# Patient Record
Sex: FEMALE | Race: WHITE | NOT HISPANIC OR LATINO | Employment: FULL TIME | URBAN - METROPOLITAN AREA
[De-identification: names, ages, dates, MRNs, and addresses within clinical notes are randomized per-mention and may not be internally consistent; named-entity substitution may affect disease eponyms.]

---

## 2023-09-08 DIAGNOSIS — Z00.00 ROUTINE HEALTH MAINTENANCE: ICD-10-CM

## 2023-09-08 RX ORDER — DESOGESTREL AND ETHINYL ESTRADIOL AND ETHINYL ESTRADIOL 21-5 (28)
1 KIT ORAL DAILY
COMMUNITY
End: 2023-09-08 | Stop reason: SDUPTHER

## 2023-09-08 RX ORDER — DESOGESTREL AND ETHINYL ESTRADIOL AND ETHINYL ESTRADIOL 21-5 (28)
1 KIT ORAL DAILY
Qty: 84 TABLET | Refills: 0 | Status: SHIPPED | OUTPATIENT
Start: 2023-09-08 | End: 2023-11-13 | Stop reason: SDUPTHER

## 2023-10-08 PROBLEM — M51.36 LUMBAR DEGENERATIVE DISC DISEASE: Status: ACTIVE | Noted: 2023-10-08

## 2023-10-08 PROBLEM — Z00.00 ROUTINE ADULT HEALTH MAINTENANCE: Status: ACTIVE | Noted: 2023-10-08

## 2023-10-08 PROBLEM — M51.369 LUMBAR DEGENERATIVE DISC DISEASE: Status: ACTIVE | Noted: 2023-10-08

## 2023-10-08 PROBLEM — E55.9 VITAMIN D DEFICIENCY: Status: ACTIVE | Noted: 2023-10-08

## 2023-10-08 PROBLEM — L20.9 ATOPIC DERMATITIS: Status: ACTIVE | Noted: 2023-10-08

## 2023-10-08 PROBLEM — M47.816 LUMBAR SPONDYLOSIS: Status: ACTIVE | Noted: 2023-10-08

## 2023-10-08 PROBLEM — Z92.89 H/O TRANSFUSION: Status: ACTIVE | Noted: 2023-10-08

## 2023-10-08 PROBLEM — Z87.42 H/O ABNORMAL CERVICAL PAPANICOLAOU SMEAR: Status: ACTIVE | Noted: 2023-10-08

## 2023-11-06 ENCOUNTER — LAB (OUTPATIENT)
Dept: LAB | Facility: LAB | Age: 53
End: 2023-11-06
Payer: COMMERCIAL

## 2023-11-06 DIAGNOSIS — E55.9 VITAMIN D DEFICIENCY, UNSPECIFIED: ICD-10-CM

## 2023-11-06 DIAGNOSIS — Z00.00 ENCOUNTER FOR GENERAL ADULT MEDICAL EXAMINATION WITHOUT ABNORMAL FINDINGS: ICD-10-CM

## 2023-11-06 LAB
25(OH)D3 SERPL-MCNC: 34 NG/ML (ref 30–100)
ALBUMIN SERPL BCP-MCNC: 4.3 G/DL (ref 3.4–5)
ALP SERPL-CCNC: 55 U/L (ref 33–110)
ALT SERPL W P-5'-P-CCNC: 14 U/L (ref 7–45)
ANION GAP SERPL CALC-SCNC: 11 MMOL/L (ref 10–20)
APPEARANCE UR: ABNORMAL
AST SERPL W P-5'-P-CCNC: 14 U/L (ref 9–39)
BASOPHILS # BLD AUTO: 0.09 X10*3/UL (ref 0–0.1)
BASOPHILS NFR BLD AUTO: 0.9 %
BILIRUB SERPL-MCNC: 0.8 MG/DL (ref 0–1.2)
BILIRUB UR STRIP.AUTO-MCNC: NEGATIVE MG/DL
BUN SERPL-MCNC: 9 MG/DL (ref 6–23)
CALCIUM SERPL-MCNC: 9.3 MG/DL (ref 8.6–10.3)
CHLORIDE SERPL-SCNC: 102 MMOL/L (ref 98–107)
CHOLEST SERPL-MCNC: 164 MG/DL (ref 0–199)
CHOLESTEROL/HDL RATIO: 2.4
CO2 SERPL-SCNC: 29 MMOL/L (ref 21–32)
COLOR UR: YELLOW
CREAT SERPL-MCNC: 0.7 MG/DL (ref 0.5–1.05)
EOSINOPHIL # BLD AUTO: 0.26 X10*3/UL (ref 0–0.7)
EOSINOPHIL NFR BLD AUTO: 2.5 %
ERYTHROCYTE [DISTWIDTH] IN BLOOD BY AUTOMATED COUNT: 11.6 % (ref 11.5–14.5)
GFR SERPL CREATININE-BSD FRML MDRD: >90 ML/MIN/1.73M*2
GLUCOSE SERPL-MCNC: 91 MG/DL (ref 74–99)
GLUCOSE UR STRIP.AUTO-MCNC: NEGATIVE MG/DL
HCT VFR BLD AUTO: 44.8 % (ref 36–46)
HDLC SERPL-MCNC: 68.7 MG/DL
HGB BLD-MCNC: 14.7 G/DL (ref 12–16)
IMM GRANULOCYTES # BLD AUTO: 0.05 X10*3/UL (ref 0–0.7)
IMM GRANULOCYTES NFR BLD AUTO: 0.5 % (ref 0–0.9)
KETONES UR STRIP.AUTO-MCNC: NEGATIVE MG/DL
LDLC SERPL CALC-MCNC: 81 MG/DL
LEUKOCYTE ESTERASE UR QL STRIP.AUTO: ABNORMAL
LYMPHOCYTES # BLD AUTO: 2.25 X10*3/UL (ref 1.2–4.8)
LYMPHOCYTES NFR BLD AUTO: 21.6 %
MCH RBC QN AUTO: 31.3 PG (ref 26–34)
MCHC RBC AUTO-ENTMCNC: 32.8 G/DL (ref 32–36)
MCV RBC AUTO: 95 FL (ref 80–100)
MONOCYTES # BLD AUTO: 0.8 X10*3/UL (ref 0.1–1)
MONOCYTES NFR BLD AUTO: 7.7 %
MUCOUS THREADS #/AREA URNS AUTO: ABNORMAL /LPF
NEUTROPHILS # BLD AUTO: 6.97 X10*3/UL (ref 1.2–7.7)
NEUTROPHILS NFR BLD AUTO: 66.8 %
NITRITE UR QL STRIP.AUTO: NEGATIVE
NON HDL CHOLESTEROL: 95 MG/DL (ref 0–149)
NRBC BLD-RTO: 0 /100 WBCS (ref 0–0)
PH UR STRIP.AUTO: 5 [PH]
PLATELET # BLD AUTO: 300 X10*3/UL (ref 150–450)
POTASSIUM SERPL-SCNC: 4.1 MMOL/L (ref 3.5–5.3)
PROT SERPL-MCNC: 6.9 G/DL (ref 6.4–8.2)
PROT UR STRIP.AUTO-MCNC: NEGATIVE MG/DL
RBC # BLD AUTO: 4.7 X10*6/UL (ref 4–5.2)
RBC # UR STRIP.AUTO: NEGATIVE /UL
RBC #/AREA URNS AUTO: ABNORMAL /HPF
SODIUM SERPL-SCNC: 138 MMOL/L (ref 136–145)
SP GR UR STRIP.AUTO: 1.02
SQUAMOUS #/AREA URNS AUTO: ABNORMAL /HPF
TRIGL SERPL-MCNC: 71 MG/DL (ref 0–149)
TSH SERPL-ACNC: 1.37 MIU/L (ref 0.44–3.98)
UROBILINOGEN UR STRIP.AUTO-MCNC: <2 MG/DL
VLDL: 14 MG/DL (ref 0–40)
WBC # BLD AUTO: 10.4 X10*3/UL (ref 4.4–11.3)
WBC #/AREA URNS AUTO: ABNORMAL /HPF

## 2023-11-06 PROCEDURE — 81001 URINALYSIS AUTO W/SCOPE: CPT

## 2023-11-06 PROCEDURE — 80053 COMPREHEN METABOLIC PANEL: CPT

## 2023-11-06 PROCEDURE — 84443 ASSAY THYROID STIM HORMONE: CPT

## 2023-11-06 PROCEDURE — 82306 VITAMIN D 25 HYDROXY: CPT

## 2023-11-06 PROCEDURE — 80061 LIPID PANEL: CPT

## 2023-11-06 PROCEDURE — 36415 COLL VENOUS BLD VENIPUNCTURE: CPT

## 2023-11-06 PROCEDURE — 85025 COMPLETE CBC W/AUTO DIFF WBC: CPT

## 2023-11-13 ENCOUNTER — OFFICE VISIT (OUTPATIENT)
Dept: PRIMARY CARE | Facility: CLINIC | Age: 53
End: 2023-11-13
Payer: COMMERCIAL

## 2023-11-13 VITALS
BODY MASS INDEX: 28.28 KG/M2 | SYSTOLIC BLOOD PRESSURE: 128 MMHG | HEART RATE: 76 BPM | DIASTOLIC BLOOD PRESSURE: 80 MMHG | HEIGHT: 66 IN | WEIGHT: 176 LBS

## 2023-11-13 DIAGNOSIS — E55.9 VITAMIN D DEFICIENCY: ICD-10-CM

## 2023-11-13 DIAGNOSIS — Z12.4 SCREENING FOR CERVICAL CANCER: ICD-10-CM

## 2023-11-13 DIAGNOSIS — Z82.49 FAMILY HISTORY OF HEART DISEASE: ICD-10-CM

## 2023-11-13 DIAGNOSIS — Z00.00 ROUTINE ADULT HEALTH MAINTENANCE: ICD-10-CM

## 2023-11-13 DIAGNOSIS — Z13.6 SCREENING FOR CARDIOVASCULAR CONDITION: ICD-10-CM

## 2023-11-13 DIAGNOSIS — Z00.00 ROUTINE HEALTH MAINTENANCE: Primary | ICD-10-CM

## 2023-11-13 DIAGNOSIS — Z12.31 ENCOUNTER FOR SCREENING MAMMOGRAM FOR BREAST CANCER: ICD-10-CM

## 2023-11-13 DIAGNOSIS — N95.1 PERIMENOPAUSE: ICD-10-CM

## 2023-11-13 DIAGNOSIS — Z23 NEED FOR IMMUNIZATION AGAINST INFLUENZA: ICD-10-CM

## 2023-11-13 PROCEDURE — 90471 IMMUNIZATION ADMIN: CPT | Performed by: INTERNAL MEDICINE

## 2023-11-13 PROCEDURE — 88175 CYTOPATH C/V AUTO FLUID REDO: CPT

## 2023-11-13 PROCEDURE — 90686 IIV4 VACC NO PRSV 0.5 ML IM: CPT | Performed by: INTERNAL MEDICINE

## 2023-11-13 PROCEDURE — 1036F TOBACCO NON-USER: CPT | Performed by: INTERNAL MEDICINE

## 2023-11-13 PROCEDURE — 3008F BODY MASS INDEX DOCD: CPT | Performed by: INTERNAL MEDICINE

## 2023-11-13 PROCEDURE — 99396 PREV VISIT EST AGE 40-64: CPT | Performed by: INTERNAL MEDICINE

## 2023-11-13 RX ORDER — DESOGESTREL AND ETHINYL ESTRADIOL AND ETHINYL ESTRADIOL 21-5 (28)
1 KIT ORAL DAILY
Qty: 84 TABLET | Refills: 3 | Status: SHIPPED | OUTPATIENT
Start: 2023-11-13

## 2023-11-13 RX ORDER — MULTIVITAMIN
1 TABLET ORAL
COMMUNITY
Start: 2018-07-13

## 2023-11-13 RX ORDER — VIT C/E/ZN/COPPR/LUTEIN/ZEAXAN 250MG-90MG
25 CAPSULE ORAL DAILY
Qty: 30 CAPSULE | Refills: 11
Start: 2023-11-13 | End: 2024-11-12

## 2023-11-13 NOTE — PATIENT INSTRUCTIONS
Do labs off birth control for 1-2 months if able  If unable do the labs during the week of your period

## 2023-11-13 NOTE — PROGRESS NOTES
ANNUAL CPE VISIT  Chief Complaint   Patient presents with    Annual Exam     Flulaval ordered   New stressors at home.    Nothing we can help with  Daughter with special needs    HPI: Labs reviewed:  Component      Latest Ref Rng 11/6/2023   WBC      4.4 - 11.3 x10*3/uL 10.4    nRBC      0.0 - 0.0 /100 WBCs 0.0    RBC      4.00 - 5.20 x10*6/uL 4.70    HEMOGLOBIN      12.0 - 16.0 g/dL 14.7    HEMATOCRIT      36.0 - 46.0 % 44.8    MCV      80 - 100 fL 95    MCH      26.0 - 34.0 pg 31.3    MCHC      32.0 - 36.0 g/dL 32.8    RED CELL DISTRIBUTION WIDTH      11.5 - 14.5 % 11.6    Platelets      150 - 450 x10*3/uL 300    Neutrophils %      40.0 - 80.0 % 66.8    Immature Granulocytes %, Automated      0.0 - 0.9 % 0.5    Lymphocytes %      13.0 - 44.0 % 21.6    Monocytes %      2.0 - 10.0 % 7.7    Eosinophils %      0.0 - 6.0 % 2.5    Basophils %      0.0 - 2.0 % 0.9    Neutrophils Absolute      1.20 - 7.70 x10*3/uL 6.97    Immature Granulocytes Absolute, Automated      0.00 - 0.70 x10*3/uL 0.05    Lymphocytes Absolute      1.20 - 4.80 x10*3/uL 2.25    Monocytes Absolute      0.10 - 1.00 x10*3/uL 0.80    Eosinophils Absolute      0.00 - 0.70 x10*3/uL 0.26    Basophils Absolute      0.00 - 0.10 x10*3/uL 0.09    GLUCOSE      74 - 99 mg/dL 91    SODIUM      136 - 145 mmol/L 138    POTASSIUM      3.5 - 5.3 mmol/L 4.1    CHLORIDE      98 - 107 mmol/L 102    Bicarbonate      21 - 32 mmol/L 29    Anion Gap      10 - 20 mmol/L 11    Blood Urea Nitrogen      6 - 23 mg/dL 9    Creatinine      0.50 - 1.05 mg/dL 0.70    EGFR      >60 mL/min/1.73m*2 >90    Calcium      8.6 - 10.3 mg/dL 9.3    Albumin      3.4 - 5.0 g/dL 4.3    Alkaline Phosphatase      33 - 110 U/L 55    Total Protein      6.4 - 8.2 g/dL 6.9    AST      9 - 39 U/L 14    Bilirubin Total      0.0 - 1.2 mg/dL 0.8    ALT      7 - 45 U/L 14    Color, Urine      Straw, Yellow  Yellow    Appearance, Urine      Clear  Hazy ! (N)    Specific Gravity, Urine      1.005 - 1.035   1.019    pH, Urine      5.0, 5.5, 6.0, 6.5, 7.0, 7.5, 8.0  5.0    Protein, Urine      NEGATIVE mg/dL NEGATIVE    Glucose, Urine      NEGATIVE mg/dL NEGATIVE    Blood, Urine      NEGATIVE  NEGATIVE    Ketones, Urine      NEGATIVE mg/dL NEGATIVE    Bilirubin, Urine      NEGATIVE  NEGATIVE    Urobilinogen, Urine      <2.0 mg/dL <2.0    Nitrite, Urine      NEGATIVE  NEGATIVE    Leukocyte Esterase, Urine      NEGATIVE  TRACE !    CHOLESTEROL      0 - 199 mg/dL 164    HDL CHOLESTEROL      mg/dL 68.7    Cholesterol/HDL Ratio 2.4    LDL Calculated      <=99 mg/dL 81    VLDL      0 - 40 mg/dL 14    TRIGLYCERIDES      0 - 149 mg/dL 71    Non HDL Cholesterol      0 - 149 mg/dL 95    WBC, Urine      1-5, NONE /HPF 6-10 !    RBC, Urine      NONE, 1-2, 3-5 /HPF NONE    Squamous Epithelial Cells, Urine      Reference range not established. /HPF 1-9 (SPARSE)    Mucus, Urine      Reference range not established. /LPF 1+    Thyroid Stimulating Hormone      0.44 - 3.98 mIU/L 1.37    Vitamin D, 25-Hydroxy, Total      30 - 100 ng/mL 34           Assessment and Plan:  Problem List Items Addressed This Visit          High    Routine adult health maintenance    Overview         PFIZER COVID 19 VACCINE 4/22/21, 5/13/21      Influenza Seasonal Inj Quadrivalent Age 3+1/19/2017, 10/25/22                     Tdap (Age 7+)2/20/2006         Tetanus Diphtheria Booster (Age >7) Pres Free1/19/2017      PAP/HPV (-) 9/14/18; 10/11/21 (-)      Mamm 10/19/18, 9/30/19; 10/13/20, 11/4/21, 11/15/22      Cscope 2/25/22 (11yrs)         Current Assessment & Plan     Annual Wellness exam completed   Preventive Health History reviewed  Ordered:   Labs    Mammogram   PAP done per request  She is still on OCP, will check FSH/LH off OCP next year when does labs  Discussed Shingrix         Relevant Orders    Comprehensive Metabolic Panel    CBC and Auto Differential    Lipid Panel    Urinalysis with Reflex Microscopic and Culture       Medium    BMI 28.0-28.9,adult  "   Overview      Failed IF      Working on diet/portions/not snacking/mindless eating         Relevant Orders    TSH with reflex to Free T4 if abnormal    Encounter for screening mammogram for breast cancer    Relevant Orders    BI mammo bilateral screening tomosynthesis    Routine health maintenance - Primary    Relevant Medications    Viorele, 28, 0.15-0.02 mgx21 /0.01 mg x 5 tablet    Screening for cervical cancer    Relevant Orders    THINPREP PAP TEST    Vitamin D deficiency    Overview     11/23: 34  Goal ~50         Current Assessment & Plan     Add 1K daily         Relevant Medications    cholecalciferol (Vitamin D-3) 25 MCG (1000 UT) capsule    Other Relevant Orders    Vitamin D 25-Hydroxy,Total (for eval of Vitamin D levels)     Other Visit Diagnoses       Need for immunization against influenza        Relevant Orders    Flu vaccine (IIV4) age 6 months and greater, preservative free (Completed)    Screening for cardiovascular condition        Relevant Orders    CT cardiac scoring wo IV contrast    Family history of heart disease        She will think about getting the free CAC test    Perimenopause        Relevant Orders    TSH with reflex to Free T4 if abnormal    FSH & LH            ROS otherwise negative aside from what was mentioned above in HPI.    Vitals  /80   Pulse 76   Ht 1.676 m (5' 6\")   Wt 79.8 kg (176 lb)   BMI 28.41 kg/m²   Body mass index is 28.41 kg/m².  Physical Exam  Gen: Alert, NAD  HEENT:  Normal exam  Neck:  No masses/nodes palpable; Thyroid nontender and without nodules; No HUY  Respiratory:  Lungs CTAB  CV: RRR  Neuro:  Gross motor and sensory intact  Skin:  No suspicious lesions present  Breast: No masses or axillary lymphadenopathy  Gyn: Normal pelvic exam: no uterine masses or cervical lesions, or CMT; no vaginal D/C. No ovarian or adnexal masses; No external vaginal or anal/perineal lesions (Pt declined chaperone)'    Active Problem List  Patient Active Problem List "   Diagnosis    Routine adult health maintenance    Atopic dermatitis    Lumbar degenerative disc disease    Lumbar spondylosis    Vitamin D deficiency    BMI 28.0-28.9,adult    H/O abnormal cervical Papanicolaou smear    H/O transfusion    Screening for cervical cancer    Encounter for screening mammogram for breast cancer    Routine health maintenance       Comprehensive Medical/Surgical/Social/Family History  Past Medical History:   Diagnosis Date    H/O echocardiogram     2007: Normal. trivial AI, trivial MR, trivial TR, and trivial PI. LVEF ~60%    H/O magnetic resonance imaging of brain and brain stem     2015: moderate pansinus disease as described above with mucosal thickening and     enhancement     Past Surgical History:   Procedure Laterality Date    COLONOSCOPY      2/25/22: normal    OTHER SURGICAL HISTORY  07/30/2019    Appendectomy     Social History     Social History Narrative    Family History        M: Thyroid nodule        F: Heart murmur, psychiatric? 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

## 2023-11-13 NOTE — ASSESSMENT & PLAN NOTE
Annual Wellness exam completed   Preventive Health History reviewed  Ordered:   Labs    Mammogram   PAP done per request  She is still on OCP, will check FSH/LH off OCP next year when does labs  Discussed Shingrix

## 2023-11-27 LAB
CYTOLOGY CMNT CVX/VAG CYTO-IMP: NORMAL
LAB AP HPV GENOTYPE QUESTION: YES
LAB AP HPV HR: NORMAL
LABORATORY COMMENT REPORT: NORMAL
PATH REPORT.TOTAL CANCER: NORMAL

## 2023-12-12 ENCOUNTER — ANCILLARY PROCEDURE (OUTPATIENT)
Dept: RADIOLOGY | Facility: CLINIC | Age: 53
End: 2023-12-12
Payer: COMMERCIAL

## 2023-12-12 DIAGNOSIS — Z12.31 ENCOUNTER FOR SCREENING MAMMOGRAM FOR BREAST CANCER: ICD-10-CM

## 2023-12-12 PROCEDURE — 77063 BREAST TOMOSYNTHESIS BI: CPT | Performed by: RADIOLOGY

## 2023-12-12 PROCEDURE — 77067 SCR MAMMO BI INCL CAD: CPT | Performed by: RADIOLOGY

## 2023-12-12 PROCEDURE — 77067 SCR MAMMO BI INCL CAD: CPT

## 2024-07-17 ENCOUNTER — APPOINTMENT (OUTPATIENT)
Dept: PRIMARY CARE | Facility: CLINIC | Age: 54
End: 2024-07-17
Payer: COMMERCIAL

## 2024-07-17 VITALS
DIASTOLIC BLOOD PRESSURE: 80 MMHG | TEMPERATURE: 98.2 F | HEART RATE: 65 BPM | OXYGEN SATURATION: 96 % | WEIGHT: 180.6 LBS | SYSTOLIC BLOOD PRESSURE: 113 MMHG | BODY MASS INDEX: 29.15 KG/M2

## 2024-07-17 DIAGNOSIS — R09.A2 FOREIGN BODY SENSATION, THROAT: Primary | ICD-10-CM

## 2024-07-17 PROCEDURE — 1036F TOBACCO NON-USER: CPT | Performed by: NURSE PRACTITIONER

## 2024-07-17 PROCEDURE — 99213 OFFICE O/P EST LOW 20 MIN: CPT | Performed by: NURSE PRACTITIONER

## 2024-07-17 RX ORDER — FAMOTIDINE 20 MG/1
20 TABLET, FILM COATED ORAL 2 TIMES DAILY
Qty: 60 TABLET | Refills: 5 | Status: SHIPPED | OUTPATIENT
Start: 2024-07-17 | End: 2025-01-13

## 2024-07-17 ASSESSMENT — PATIENT HEALTH QUESTIONNAIRE - PHQ9
1. LITTLE INTEREST OR PLEASURE IN DOING THINGS: NOT AT ALL
SUM OF ALL RESPONSES TO PHQ9 QUESTIONS 1 AND 2: 0
2. FEELING DOWN, DEPRESSED OR HOPELESS: NOT AT ALL

## 2024-07-17 NOTE — PROGRESS NOTES
Problem List Items Addressed This Visit    None  Visit Diagnoses       Foreign body sensation, throat    -  Primary    discomfort with thyroid palpation on exam  given maternal hx nodules, will get US  ? uncontrolled GERD v PND  - try pepcid 20 mg bid  if persists ENT fu    Relevant Medications    famotidine (Pepcid) 20 mg tablet    Other Relevant Orders    US thyroid             Subjective   Patient ID: Idalia Arshad is a 53 y.o. female who presents for feels like something in her throat (feels like something in her throat occasionally  she can still swallow/Has been going on for a few months does not happen with eating/No hx of gerd or acid reflux//Stopped taking birth control in December has not had a period since then).  HPI  Sensation in throat  Comes & comes  Feels like phlegm stuck  No choking  No dyspnea  No hemoptysis  No epigastric discomfort  Exacerbated by:   Relieved by:   Notices more in the afternoon   No belching  No treatments   No lymphs    Component      Latest Ref Rn 11/6/2023   LEUKOCYTES (10*3/UL) IN BLOOD BY AUTOMATED COUNT, Arabic      4.4 - 11.3 x10*3/uL 10.4    nRBC      0.0 - 0.0 /100 WBCs 0.0    ERYTHROCYTES (10*6/UL) IN BLOOD BY AUTOMATED COUNT, Arabic      4.00 - 5.20 x10*6/uL 4.70    HEMOGLOBIN      12.0 - 16.0 g/dL 14.7    HEMATOCRIT      36.0 - 46.0 % 44.8    MCV      80 - 100 fL 95    MCH      26.0 - 34.0 pg 31.3    MCHC      32.0 - 36.0 g/dL 32.8    RED CELL DISTRIBUTION WIDTH      11.5 - 14.5 % 11.6    PLATELETS (10*3/UL) IN BLOOD AUTOMATED COUNT, Arabic      150 - 450 x10*3/uL 300    NEUTROPHILS/100 LEUKOCYTES IN BLOOD BY AUTOMATED COUNT, Arabic      40.0 - 80.0 % 66.8    Immature Granulocytes %, Automated      0.0 - 0.9 % 0.5    Lymphocytes %      13.0 - 44.0 % 21.6    Monocytes %      2.0 - 10.0 % 7.7    Eosinophils %      0.0 - 6.0 % 2.5    Basophils %      0.0 - 2.0 % 0.9    NEUTROPHILS (10*3/UL) IN BLOOD BY AUTOMATED COUNT, Arabic      1.20 - 7.70 x10*3/uL 6.97     Immature Granulocytes Absolute, Automated      0.00 - 0.70 x10*3/uL 0.05    Lymphocytes Absolute      1.20 - 4.80 x10*3/uL 2.25    Monocytes Absolute      0.10 - 1.00 x10*3/uL 0.80    Eosinophils Absolute      0.00 - 0.70 x10*3/uL 0.26    Basophils Absolute      0.00 - 0.10 x10*3/uL 0.09    GLUCOSE      74 - 99 mg/dL 91    SODIUM      136 - 145 mmol/L 138    POTASSIUM      3.5 - 5.3 mmol/L 4.1    CHLORIDE      98 - 107 mmol/L 102    Bicarbonate      21 - 32 mmol/L 29    Anion Gap      10 - 20 mmol/L 11    Blood Urea Nitrogen      6 - 23 mg/dL 9    Creatinine      0.50 - 1.05 mg/dL 0.70    EGFR      >60 mL/min/1.73m*2 >90    Calcium      8.6 - 10.3 mg/dL 9.3    Albumin      3.4 - 5.0 g/dL 4.3    Alkaline Phosphatase      33 - 110 U/L 55    Total Protein      6.4 - 8.2 g/dL 6.9    AST      9 - 39 U/L 14    Bilirubin Total      0.0 - 1.2 mg/dL 0.8    ALT      7 - 45 U/L 14    Color, Urine      Straw, Yellow  Yellow    Appearance, Urine      Clear  Hazy ! (N)    Specific Gravity, Urine      1.005 - 1.035  1.019    pH, Urine      5.0, 5.5, 6.0, 6.5, 7.0, 7.5, 8.0  5.0    Protein, Urine      NEGATIVE mg/dL NEGATIVE    Glucose, Urine      NEGATIVE mg/dL NEGATIVE    Blood, Urine      NEGATIVE  NEGATIVE    Ketones, Urine      NEGATIVE mg/dL NEGATIVE    Bilirubin, Urine      NEGATIVE  NEGATIVE    Urobilinogen, Urine      <2.0 mg/dL <2.0    Nitrite, Urine      NEGATIVE  NEGATIVE    Leukocyte Esterase, Urine      NEGATIVE  TRACE !    CHOLESTEROL      0 - 199 mg/dL 164    HDL CHOLESTEROL      mg/dL 68.7    Cholesterol/HDL Ratio 2.4    LDL Calculated      <=99 mg/dL 81    VLDL      0 - 40 mg/dL 14    TRIGLYCERIDES      0 - 149 mg/dL 71    Non HDL Cholesterol      0 - 149 mg/dL 95    WBC, Urine      1-5, NONE /HPF 6-10 !    RBC, Urine      NONE, 1-2, 3-5 /HPF NONE    Squamous Epithelial Cells, Urine      Reference range not established. /HPF 1-9 (SPARSE)    Mucus, Urine      Reference range not established. /LPF 1+    Thyroid  "Stimulating Hormone      0.44 - 3.98 mIU/L 1.37    Vitamin D, 25-Hydroxy, Total      30 - 100 ng/mL 34         Review of Systems   All other systems reviewed and are negative.      BP Readings from Last 3 Encounters:   07/17/24 113/80   11/13/23 128/80   10/25/22 125/83      Wt Readings from Last 3 Encounters:   07/17/24 81.9 kg (180 lb 9.6 oz)   11/13/23 79.8 kg (176 lb)   10/25/22 83.5 kg (184 lb)      BMI:   Estimated body mass index is 29.15 kg/m² as calculated from the following:    Height as of 11/13/23: 1.676 m (5' 6\").    Weight as of this encounter: 81.9 kg (180 lb 9.6 oz).    Objective   Physical Exam  Constitutional:       General: She is not in acute distress.  HENT:      Head: Normocephalic and atraumatic.      Right Ear: Tympanic membrane and external ear normal.      Left Ear: Tympanic membrane and external ear normal.      Nose: Nose normal.      Mouth/Throat:      Mouth: Mucous membranes are moist.   Eyes:      Extraocular Movements: Extraocular movements intact.      Conjunctiva/sclera: Conjunctivae normal.   Neck:      Comments: Discomfort when palpating over bilat thyroid. No lymphs or nodules palpated   Cardiovascular:      Rate and Rhythm: Normal rate and regular rhythm.      Pulses: Normal pulses.   Pulmonary:      Effort: Pulmonary effort is normal.      Breath sounds: Normal breath sounds.   Abdominal:      General: Bowel sounds are normal.      Palpations: Abdomen is soft.   Musculoskeletal:         General: Normal range of motion.      Cervical back: Normal range of motion and neck supple.   Skin:     General: Skin is warm and dry.   Neurological:      General: No focal deficit present.      Mental Status: She is alert.   Psychiatric:         Mood and Affect: Mood normal.       "

## 2024-07-18 ENCOUNTER — HOSPITAL ENCOUNTER (OUTPATIENT)
Dept: RADIOLOGY | Facility: CLINIC | Age: 54
Discharge: HOME | End: 2024-07-18
Payer: COMMERCIAL

## 2024-07-18 DIAGNOSIS — R09.A2 FOREIGN BODY SENSATION, THROAT: ICD-10-CM

## 2024-07-18 PROCEDURE — 76536 US EXAM OF HEAD AND NECK: CPT | Performed by: STUDENT IN AN ORGANIZED HEALTH CARE EDUCATION/TRAINING PROGRAM

## 2024-07-18 PROCEDURE — 76536 US EXAM OF HEAD AND NECK: CPT

## 2024-07-20 DIAGNOSIS — R09.A2 FOREIGN BODY SENSATION, THROAT: ICD-10-CM

## 2024-07-20 DIAGNOSIS — E04.1 THYROID NODULE: Primary | ICD-10-CM

## 2024-07-22 ENCOUNTER — PATIENT MESSAGE (OUTPATIENT)
Dept: PRIMARY CARE | Facility: CLINIC | Age: 54
End: 2024-07-22

## 2024-07-22 ENCOUNTER — LAB (OUTPATIENT)
Dept: LAB | Facility: LAB | Age: 54
End: 2024-07-22
Payer: COMMERCIAL

## 2024-07-22 DIAGNOSIS — E04.1 THYROID NODULE: ICD-10-CM

## 2024-07-22 LAB
T3FREE SERPL-MCNC: 3.3 PG/ML (ref 2.3–4.2)
T4 FREE SERPL-MCNC: 0.97 NG/DL (ref 0.61–1.12)
THYROPEROXIDASE AB SERPL-ACNC: 42 IU/ML
TSH SERPL-ACNC: 1.22 MIU/L (ref 0.44–3.98)

## 2024-07-22 PROCEDURE — 84439 ASSAY OF FREE THYROXINE: CPT

## 2024-07-22 PROCEDURE — 86376 MICROSOMAL ANTIBODY EACH: CPT

## 2024-07-22 PROCEDURE — 84443 ASSAY THYROID STIM HORMONE: CPT

## 2024-07-22 PROCEDURE — 84481 FREE ASSAY (FT-3): CPT

## 2024-07-22 PROCEDURE — 36415 COLL VENOUS BLD VENIPUNCTURE: CPT

## 2024-08-23 ENCOUNTER — APPOINTMENT (OUTPATIENT)
Dept: OTOLARYNGOLOGY | Facility: CLINIC | Age: 54
End: 2024-08-23
Payer: COMMERCIAL

## 2024-08-23 DIAGNOSIS — E04.1 THYROID NODULE: ICD-10-CM

## 2024-08-23 DIAGNOSIS — R09.A2 GLOBUS SENSATION: Primary | ICD-10-CM

## 2024-08-23 PROCEDURE — 99203 OFFICE O/P NEW LOW 30 MIN: CPT | Performed by: OTOLARYNGOLOGY

## 2024-08-23 NOTE — PROGRESS NOTES
Idalia Arshad is a 53 y.o. year old female patient with THYROID NODULES and GLOBUS     Patient presents to the office today for assessment of thyroid.  The patient was experiencing globus sensation which seems to come and go.  The patient had thyroid ultrasound completed which demonstrated an 8 x 4 x 4 mm thyroid nodule involving the left lobe with TR4 characteristics.  Patient denies difficulty swallowing.  All other ENT issues are negative.      Review of Systems   All other systems reviewed and are negative.        Physical Exam:   General appearance: No acute distress. Normal facies. Symmetric facial movement. No gross lesions of the face are noted.  The external ear structures appear normal. The ear canals patent and the tympanic membranes are intact without evidence of air-fluid levels, retraction, or congenital defects.  Anterior rhinoscopy notes essentially a midline nasal septum. Examination is noted for normal healthy mucosal membranes without any evidence of lesions, polyps, or exudate. The tongue is normally mobile. There are no lesions on the gingiva, buccal, or oral mucosa. There are no oral cavity masses.  The neck is negative for mass lymphadenopathy. The trachea and parotid are clear. The thyroid bed is grossly unremarkable. The salivary gland structures are grossly unremarkable.  The laryngeal structures are noted for grossly normal appearance. The true vocal cords, the false focal cords, and the remaining structures including the epiglottis, piriform sinuses, and base of tongue are all grossly normal. There is no evidence of gross mass nodule, polyp, tumor or other defect.    Assessment/Plan   1.  Globus sensation  2.  Thyroid nodule    Patient seen in the office today with globus sensation and thyroid nodule.  Physical exam unremarkable.  Patient with a subcentimeter TR 4 nodule on the left and I favor observation with repeat ultrasound in 1 year.

## 2024-11-15 ENCOUNTER — LAB (OUTPATIENT)
Dept: LAB | Facility: LAB | Age: 54
End: 2024-11-15
Payer: COMMERCIAL

## 2024-11-15 DIAGNOSIS — N95.1 PERIMENOPAUSE: ICD-10-CM

## 2024-11-15 DIAGNOSIS — Z00.00 ROUTINE ADULT HEALTH MAINTENANCE: ICD-10-CM

## 2024-11-15 DIAGNOSIS — E55.9 VITAMIN D DEFICIENCY: ICD-10-CM

## 2024-11-15 LAB
25(OH)D3 SERPL-MCNC: 30 NG/ML (ref 30–100)
ALBUMIN SERPL BCP-MCNC: 4.4 G/DL (ref 3.4–5)
ALP SERPL-CCNC: 102 U/L (ref 33–110)
ALT SERPL W P-5'-P-CCNC: 24 U/L (ref 7–45)
ANION GAP SERPL CALC-SCNC: 12 MMOL/L (ref 10–20)
APPEARANCE UR: CLEAR
AST SERPL W P-5'-P-CCNC: 18 U/L (ref 9–39)
BACTERIA #/AREA URNS AUTO: ABNORMAL /HPF
BASOPHILS # BLD AUTO: 0.07 X10*3/UL (ref 0–0.1)
BASOPHILS NFR BLD AUTO: 1 %
BILIRUB SERPL-MCNC: 0.9 MG/DL (ref 0–1.2)
BILIRUB UR STRIP.AUTO-MCNC: NEGATIVE MG/DL
BUN SERPL-MCNC: 11 MG/DL (ref 6–23)
CALCIUM SERPL-MCNC: 9.1 MG/DL (ref 8.6–10.3)
CHLORIDE SERPL-SCNC: 106 MMOL/L (ref 98–107)
CHOLEST SERPL-MCNC: 171 MG/DL (ref 0–199)
CHOLESTEROL/HDL RATIO: 2.7
CO2 SERPL-SCNC: 26 MMOL/L (ref 21–32)
COLOR UR: ABNORMAL
CREAT SERPL-MCNC: 0.59 MG/DL (ref 0.5–1.05)
EGFRCR SERPLBLD CKD-EPI 2021: >90 ML/MIN/1.73M*2
EOSINOPHIL # BLD AUTO: 0.4 X10*3/UL (ref 0–0.7)
EOSINOPHIL NFR BLD AUTO: 5.6 %
ERYTHROCYTE [DISTWIDTH] IN BLOOD BY AUTOMATED COUNT: 11.7 % (ref 11.5–14.5)
FSH SERPL-ACNC: 71.7 IU/L
GLUCOSE SERPL-MCNC: 99 MG/DL (ref 74–99)
GLUCOSE UR STRIP.AUTO-MCNC: NORMAL MG/DL
HCT VFR BLD AUTO: 43.5 % (ref 36–46)
HDLC SERPL-MCNC: 62.2 MG/DL
HGB BLD-MCNC: 14.9 G/DL (ref 12–16)
IMM GRANULOCYTES # BLD AUTO: 0.03 X10*3/UL (ref 0–0.7)
IMM GRANULOCYTES NFR BLD AUTO: 0.4 % (ref 0–0.9)
KETONES UR STRIP.AUTO-MCNC: NEGATIVE MG/DL
LDLC SERPL CALC-MCNC: 96 MG/DL
LEUKOCYTE ESTERASE UR QL STRIP.AUTO: ABNORMAL
LH SERPL-ACNC: 30 IU/L
LYMPHOCYTES # BLD AUTO: 2.19 X10*3/UL (ref 1.2–4.8)
LYMPHOCYTES NFR BLD AUTO: 30.4 %
MCH RBC QN AUTO: 31.5 PG (ref 26–34)
MCHC RBC AUTO-ENTMCNC: 34.3 G/DL (ref 32–36)
MCV RBC AUTO: 92 FL (ref 80–100)
MONOCYTES # BLD AUTO: 0.69 X10*3/UL (ref 0.1–1)
MONOCYTES NFR BLD AUTO: 9.6 %
MUCOUS THREADS #/AREA URNS AUTO: ABNORMAL /LPF
NEUTROPHILS # BLD AUTO: 3.82 X10*3/UL (ref 1.2–7.7)
NEUTROPHILS NFR BLD AUTO: 53 %
NITRITE UR QL STRIP.AUTO: NEGATIVE
NON HDL CHOLESTEROL: 109 MG/DL (ref 0–149)
NRBC BLD-RTO: 0 /100 WBCS (ref 0–0)
PH UR STRIP.AUTO: 7 [PH]
PLATELET # BLD AUTO: 260 X10*3/UL (ref 150–450)
POTASSIUM SERPL-SCNC: 4 MMOL/L (ref 3.5–5.3)
PROT SERPL-MCNC: 6.9 G/DL (ref 6.4–8.2)
PROT UR STRIP.AUTO-MCNC: NEGATIVE MG/DL
RBC # BLD AUTO: 4.73 X10*6/UL (ref 4–5.2)
RBC # UR STRIP.AUTO: NEGATIVE /UL
RBC #/AREA URNS AUTO: ABNORMAL /HPF
RENAL EPI CELLS #/AREA UR COMP ASSIST: ABNORMAL /HPF
SODIUM SERPL-SCNC: 140 MMOL/L (ref 136–145)
SP GR UR STRIP.AUTO: 1.02
SQUAMOUS #/AREA URNS AUTO: ABNORMAL /HPF
TRIGL SERPL-MCNC: 63 MG/DL (ref 0–149)
TSH SERPL-ACNC: 0.77 MIU/L (ref 0.44–3.98)
UROBILINOGEN UR STRIP.AUTO-MCNC: NORMAL MG/DL
VLDL: 13 MG/DL (ref 0–40)
WBC # BLD AUTO: 7.2 X10*3/UL (ref 4.4–11.3)
WBC #/AREA URNS AUTO: ABNORMAL /HPF

## 2024-11-15 PROCEDURE — 80053 COMPREHEN METABOLIC PANEL: CPT

## 2024-11-15 PROCEDURE — 81001 URINALYSIS AUTO W/SCOPE: CPT

## 2024-11-15 PROCEDURE — 83001 ASSAY OF GONADOTROPIN (FSH): CPT

## 2024-11-15 PROCEDURE — 80061 LIPID PANEL: CPT

## 2024-11-15 PROCEDURE — 84443 ASSAY THYROID STIM HORMONE: CPT

## 2024-11-15 PROCEDURE — 85025 COMPLETE CBC W/AUTO DIFF WBC: CPT

## 2024-11-15 PROCEDURE — 36415 COLL VENOUS BLD VENIPUNCTURE: CPT

## 2024-11-15 PROCEDURE — 82306 VITAMIN D 25 HYDROXY: CPT

## 2024-11-15 PROCEDURE — 83002 ASSAY OF GONADOTROPIN (LH): CPT

## 2024-11-18 ENCOUNTER — APPOINTMENT (OUTPATIENT)
Dept: PRIMARY CARE | Facility: CLINIC | Age: 54
End: 2024-11-18
Payer: COMMERCIAL

## 2024-11-18 VITALS
HEART RATE: 71 BPM | WEIGHT: 190 LBS | SYSTOLIC BLOOD PRESSURE: 101 MMHG | HEIGHT: 67 IN | DIASTOLIC BLOOD PRESSURE: 67 MMHG | TEMPERATURE: 97.7 F | BODY MASS INDEX: 29.82 KG/M2 | OXYGEN SATURATION: 97 %

## 2024-11-18 DIAGNOSIS — Z12.9 SCREENING FOR MALIGNANT NEOPLASM: ICD-10-CM

## 2024-11-18 DIAGNOSIS — M51.369 DEGENERATION OF INTERVERTEBRAL DISC OF LUMBAR REGION, UNSPECIFIED WHETHER PAIN PRESENT: ICD-10-CM

## 2024-11-18 DIAGNOSIS — L98.9 CHANGING SKIN LESION: ICD-10-CM

## 2024-11-18 DIAGNOSIS — N95.1 SYMPTOMATIC MENOPAUSAL OR FEMALE CLIMACTERIC STATES: ICD-10-CM

## 2024-11-18 DIAGNOSIS — E04.1 THYROID NODULE: ICD-10-CM

## 2024-11-18 DIAGNOSIS — M47.816 LUMBAR SPONDYLOSIS: ICD-10-CM

## 2024-11-18 DIAGNOSIS — Z00.00 ROUTINE ADULT HEALTH MAINTENANCE: Primary | ICD-10-CM

## 2024-11-18 DIAGNOSIS — E55.9 VITAMIN D DEFICIENCY: ICD-10-CM

## 2024-11-18 DIAGNOSIS — Z12.31 ENCOUNTER FOR SCREENING MAMMOGRAM FOR BREAST CANCER: ICD-10-CM

## 2024-11-18 PROBLEM — L20.9 ATOPIC DERMATITIS: Status: RESOLVED | Noted: 2023-10-08 | Resolved: 2024-11-18

## 2024-11-18 PROBLEM — R09.A2 GLOBUS SENSATION: Status: RESOLVED | Noted: 2024-08-23 | Resolved: 2024-11-18

## 2024-11-18 PROCEDURE — 3008F BODY MASS INDEX DOCD: CPT | Performed by: INTERNAL MEDICINE

## 2024-11-18 PROCEDURE — 90471 IMMUNIZATION ADMIN: CPT | Performed by: INTERNAL MEDICINE

## 2024-11-18 PROCEDURE — 90656 IIV3 VACC NO PRSV 0.5 ML IM: CPT | Performed by: INTERNAL MEDICINE

## 2024-11-18 PROCEDURE — 1036F TOBACCO NON-USER: CPT | Performed by: INTERNAL MEDICINE

## 2024-11-18 PROCEDURE — 99396 PREV VISIT EST AGE 40-64: CPT | Performed by: INTERNAL MEDICINE

## 2024-11-18 RX ORDER — VIT C/E/ZN/COPPR/LUTEIN/ZEAXAN 250MG-90MG
400 CAPSULE ORAL DAILY
Start: 2024-11-18 | End: 2025-11-18

## 2024-11-18 ASSESSMENT — PATIENT HEALTH QUESTIONNAIRE - PHQ9
2. FEELING DOWN, DEPRESSED OR HOPELESS: NOT AT ALL
SUM OF ALL RESPONSES TO PHQ9 QUESTIONS 1 AND 2: 0
1. LITTLE INTEREST OR PLEASURE IN DOING THINGS: NOT AT ALL

## 2024-11-18 NOTE — ASSESSMENT & PLAN NOTE
Annual Wellness exam completed   Preventive Health History reviewed  Ordered:   Labs    Mammogram   Flu vaccine today  Shingrix discussed

## 2024-11-18 NOTE — PROGRESS NOTES
ANNUAL CPE VISIT  Chief Complaint   Patient presents with    Annual Exam     HPI:  Has some low back pain  Had  XR 2020: IMPRESSION:  Mild anterolisthesis of L5 on S1 with evidence of dynamic instability. Moderate spondylosis and facet disease at L5-S1. Questionable pars defects at L5 bilaterally.    Intense hot flashes  Doesn't want to treat them    Saw ENT 8/23 (Copied)  1.  Globus sensation  2.  Thyroid nodule  Patient seen in the office today with globus sensation and thyroid nodule.  Physical exam unremarkable.  Patient with a subcentimeter TR 4 nodule on the left and I favor observation with repeat ultrasound in 1 year.  Labs reviewed:  Component      Latest Ref Rng 7/22/2024   Thyroid Stimulating Hormone      0.44 - 3.98 mIU/L 1.22    Thyroxine, Free      0.61 - 1.12 ng/dL 0.97    Triiodothyronine, Free      2.3 - 4.2 pg/mL 3.3    Thyroid Peroxidase (TPO) Antibody      <=60 IU/mL 42        Component      Latest Ref Rng 11/15/2024   Vitamin D, 25-Hydroxy, Total      30 - 100 ng/mL 30        Component      Latest Ref Rng 11/15/2024   WBC      4.4 - 11.3 x10*3/uL 7.2    nRBC      0.0 - 0.0 /100 WBCs 0.0    RBC      4.00 - 5.20 x10*6/uL 4.73    HEMOGLOBIN      12.0 - 16.0 g/dL 14.9    HEMATOCRIT      36.0 - 46.0 % 43.5    MCV      80 - 100 fL 92    MCH      26.0 - 34.0 pg 31.5    MCHC      32.0 - 36.0 g/dL 34.3    RED CELL DISTRIBUTION WIDTH      11.5 - 14.5 % 11.7    Platelets      150 - 450 x10*3/uL 260    Neutrophils %      40.0 - 80.0 % 53.0    Immature Granulocytes %, Automated      0.0 - 0.9 % 0.4    Lymphocytes %      13.0 - 44.0 % 30.4    Monocytes %      2.0 - 10.0 % 9.6    Eosinophils %      0.0 - 6.0 % 5.6    Basophils %      0.0 - 2.0 % 1.0    Neutrophils Absolute      1.20 - 7.70 x10*3/uL 3.82    Immature Granulocytes Absolute, Automated      0.00 - 0.70 x10*3/uL 0.03    Lymphocytes Absolute      1.20 - 4.80 x10*3/uL 2.19    Monocytes Absolute      0.10 - 1.00 x10*3/uL 0.69    Eosinophils Absolute       0.00 - 0.70 x10*3/uL 0.40    Basophils Absolute      0.00 - 0.10 x10*3/uL 0.07    GLUCOSE      74 - 99 mg/dL 99    SODIUM      136 - 145 mmol/L 140    POTASSIUM      3.5 - 5.3 mmol/L 4.0    CHLORIDE      98 - 107 mmol/L 106    Bicarbonate      21 - 32 mmol/L 26    Anion Gap      10 - 20 mmol/L 12    Blood Urea Nitrogen      6 - 23 mg/dL 11    Creatinine      0.50 - 1.05 mg/dL 0.59    EGFR      >60 mL/min/1.73m*2 >90    Calcium      8.6 - 10.3 mg/dL 9.1    Albumin      3.4 - 5.0 g/dL 4.4    Alkaline Phosphatase      33 - 110 U/L 102    Total Protein      6.4 - 8.2 g/dL 6.9    AST      9 - 39 U/L 18    Bilirubin Total      0.0 - 1.2 mg/dL 0.9    ALT      7 - 45 U/L 24    Color, Urine      Light-Yellow, Yellow, Dark-Yellow  Light-Yellow    Appearance, Urine      Clear  Clear    Specific Gravity, Urine      1.005 - 1.035  1.021    pH, Urine      5.0, 5.5, 6.0, 6.5, 7.0, 7.5, 8.0  7.0    Protein, Urine      NEGATIVE, 10 (TRACE), 20 (TRACE) mg/dL NEGATIVE    Glucose, Urine      Normal mg/dL Normal    Blood, Urine      NEGATIVE  NEGATIVE    Ketones, Urine      NEGATIVE mg/dL NEGATIVE    Bilirubin, Urine      NEGATIVE  NEGATIVE    Urobilinogen, Urine      Normal mg/dL Normal    Nitrite, Urine      NEGATIVE  NEGATIVE    Leukocyte Esterase, Urine      NEGATIVE  250 Omayra/µL !    CHOLESTEROL      0 - 199 mg/dL 171    HDL CHOLESTEROL      mg/dL 62.2    Cholesterol/HDL Ratio 2.7    LDL Calculated      <=99 mg/dL 96    VLDL      0 - 40 mg/dL 13    TRIGLYCERIDES      0 - 149 mg/dL 63    Non HDL Cholesterol      0 - 149 mg/dL 109    WBC, Urine      1-5, NONE /HPF 11-20 !    RBC, Urine      NONE, 1-2, 3-5 /HPF 1-2    Squamous Epithelial Cells, Urine      Reference range not established. /HPF 1-9 (SPARSE)    Renal Epithelial Cells, Urine      Reference range not established. /HPF 1-2 (FEW)    Bacteria, Urine      NONE SEEN /HPF 1+ !    Mucus, Urine      Reference range not established. /LPF FEW    FOLLICLE STIMULATING HORMONE       IU/L 71.7    LH      IU/L 30.0    Thyroid Stimulating Hormone      0.44 - 3.98 mIU/L 0.77        Assessment and Plan:  Problem List Items Addressed This Visit          High    Routine adult health maintenance - Primary    Overview         PFIZER COVID 19 VACCINE 4/22/21, 5/13/21      Influenza Seasonal Inj Quadrivalent Age 3+1/19/2017, 10/25/22, 11/13/23               Tdap (Age 7+)2/20/2006         Td 1/19/2017      PAP/HPV (-) 9/14/18; 10/11/21 (-); PAP 11/28/23 (-)      Mamm 10/19/18, 9/30/19; 10/13/20, 11/4/21, 11/15/22, 12/12/23      Cscope 2/25/22 (10yrs)               12/2024 stopped OCP; as of 7/2024 no menses          Current Assessment & Plan     Annual Wellness exam completed   Preventive Health History reviewed  Ordered:   Labs    Mammogram   Flu vaccine today  Shingrix discussed           Relevant Orders    Flu vaccine, trivalent, preservative free, age 6 months and greater (Fluarix/Fluzone/Flulaval) (Completed)    Comprehensive Metabolic Panel    CBC and Auto Differential    Urinalysis with Reflex Microscopic    Lipid Panel       Medium    Encounter for screening mammogram for breast cancer    Relevant Orders    BI mammo bilateral screening tomosynthesis    Lumbar degenerative disc disease    Lumbar spondylosis    Overview     XR 9/20: mild anterolisthesis of L5 on S1. Findings are worse than on flexion compared  to extension suggestive of dynamic instability. Underlying a pars defect at L5 may be  present.. Moderate facet disease and moderate spondylosis at L5-S1.         Current Assessment & Plan     Will try Physical Therapy   No [facility]  [phone]           Symptomatic menopausal or female climacteric states    Overview     LMP 12/ 2023  Went off OCP then          Current Assessment & Plan     Try Vit E         Relevant Medications    vitamin E 180 mg (400 unit) capsule    Thyroid nodule    Overview     7/2024 US:  8 mm left upper pole TIRADS 4 nodule.   TR4  (4-6 points)  "moderately suspicious - FNA if ? 1.5cm, follow-up if 1  -1.4 cm in 1, 2, 3 and 5 years. Aggregate cancer risk 9.1%  S/p ENT consult 8/24: Physical exam unremarkable.  Patient with a subcentimeter TR 4 nodule on the left and I favor observation with repeat ultrasound in 1 year.         Current Assessment & Plan     US ordered for 2025         Relevant Orders    TSH with reflex to Free T4 if abnormal    Vitamin D deficiency    Overview     11/23: 34  Goal ~50         Relevant Orders    Vitamin D 25-Hydroxy,Total (for eval of Vitamin D levels)     Other Visit Diagnoses       Screening for malignant neoplasm        she is worried about something ore sisister causing her back pain  will get;    Relevant Orders    MR pancreas screening self pay    Changing skin lesion        Relevant Orders    Referral to Dermatology          ROS otherwise negative aside from what was mentioned above in HPI.    Vitals  /67   Pulse 71   Temp 36.5 °C (97.7 °F)   Ht 1.689 m (5' 6.5\")   Wt 86.2 kg (190 lb)   SpO2 97%   BMI 30.21 kg/m²   Body mass index is 30.21 kg/m².  Physical Exam  Gen: Alert, NAD  HEENT:  Normal exam  Neck:  No masses/nodes palpable; Thyroid nontender and without nodules; No HUY  Respiratory:  Lungs CTAB  CV: RRR  Neuro:  Gross motor and sensory intact  Back; tight hamstrongs  Skin:  No suspicious lesions present  Breast: No masses or axillary lymphadenopathy  Gyn: Normal pelvic exam: no uterine masses or cervical lesions, or CMT; no vaginal D/C. No ovarian or adnexal masses; No external vaginal or anal/perineal lesions (Pt declined chaperone)    Allergies and Medications  Pollen extracts  Current Outpatient Medications   Medication Instructions    cholecalciferol (VITAMIN D-3) 25 mcg, oral, Daily    multivitamin tablet 1 tablet, Daily RT    vitamin E 400 Units, oral, Daily       Active Problem List  Patient Active Problem List   Diagnosis    Routine adult health maintenance    Lumbar degenerative disc disease "    Lumbar spondylosis    Vitamin D deficiency    BMI 30.0-30.9,adult    H/O abnormal cervical Papanicolaou smear    H/O transfusion    Screening for cervical cancer    Encounter for screening mammogram for breast cancer    Thyroid nodule    Symptomatic menopausal or female climacteric states       Comprehensive Medical/Surgical/Social/Family History  Past Medical History:   Diagnosis Date    H/O diagnostic ultrasound 07/18/2024    8 mm left upper pole TIRADS 4 nodule.    H/O echocardiogram     2007: Normal. trivial AI, trivial MR, trivial TR, and trivial PI. LVEF ~60%    H/O magnetic resonance imaging of brain and brain stem     2015: moderate pansinus disease as described above with mucosal thickening and     enhancement     Past Surgical History:   Procedure Laterality Date    COLONOSCOPY      2/25/22: normal    OTHER SURGICAL HISTORY  07/30/2019    Appendectomy       Social History     Social History Narrative    Family History        M: Thyroid nodule        F: Heart murmur, psychiatric? 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

## 2024-12-18 ENCOUNTER — HOSPITAL ENCOUNTER (OUTPATIENT)
Dept: RADIOLOGY | Facility: CLINIC | Age: 54
Discharge: HOME | End: 2024-12-18
Payer: COMMERCIAL

## 2024-12-18 VITALS — WEIGHT: 185 LBS | BODY MASS INDEX: 29.73 KG/M2 | HEIGHT: 66 IN

## 2024-12-18 DIAGNOSIS — Z12.31 ENCOUNTER FOR SCREENING MAMMOGRAM FOR BREAST CANCER: ICD-10-CM

## 2024-12-18 PROCEDURE — 77063 BREAST TOMOSYNTHESIS BI: CPT | Performed by: RADIOLOGY

## 2024-12-18 PROCEDURE — 77063 BREAST TOMOSYNTHESIS BI: CPT

## 2024-12-18 PROCEDURE — 77067 SCR MAMMO BI INCL CAD: CPT | Performed by: RADIOLOGY

## 2025-09-15 ENCOUNTER — APPOINTMENT (OUTPATIENT)
Dept: OTOLARYNGOLOGY | Facility: CLINIC | Age: 55
End: 2025-09-15
Payer: COMMERCIAL

## 2025-12-19 ENCOUNTER — APPOINTMENT (OUTPATIENT)
Dept: PRIMARY CARE | Facility: CLINIC | Age: 55
End: 2025-12-19
Payer: COMMERCIAL